# Patient Record
Sex: FEMALE
[De-identification: names, ages, dates, MRNs, and addresses within clinical notes are randomized per-mention and may not be internally consistent; named-entity substitution may affect disease eponyms.]

---

## 2021-09-17 PROBLEM — Z00.00 ENCOUNTER FOR PREVENTIVE HEALTH EXAMINATION: Status: ACTIVE | Noted: 2021-09-17

## 2021-09-22 ENCOUNTER — APPOINTMENT (OUTPATIENT)
Dept: BARIATRICS | Facility: CLINIC | Age: 66
End: 2021-09-22
Payer: MEDICARE

## 2021-09-22 VITALS — HEIGHT: 68 IN | WEIGHT: 195 LBS | BODY MASS INDEX: 29.55 KG/M2

## 2021-09-22 DIAGNOSIS — Z98.84 BARIATRIC SURGERY STATUS: ICD-10-CM

## 2021-09-22 DIAGNOSIS — E78.00 PURE HYPERCHOLESTEROLEMIA, UNSPECIFIED: ICD-10-CM

## 2021-09-22 DIAGNOSIS — E07.9 DISORDER OF THYROID, UNSPECIFIED: ICD-10-CM

## 2021-09-22 DIAGNOSIS — M06.9 RHEUMATOID ARTHRITIS, UNSPECIFIED: ICD-10-CM

## 2021-09-22 DIAGNOSIS — I51.9 HEART DISEASE, UNSPECIFIED: ICD-10-CM

## 2021-09-22 DIAGNOSIS — I10 ESSENTIAL (PRIMARY) HYPERTENSION: ICD-10-CM

## 2021-09-22 DIAGNOSIS — I25.10 ATHEROSCLEROTIC HEART DISEASE OF NATIVE CORONARY ARTERY W/OUT ANGINA PECTORIS: ICD-10-CM

## 2021-09-22 PROCEDURE — 99203 OFFICE O/P NEW LOW 30 MIN: CPT | Mod: 95

## 2021-09-22 NOTE — ASSESSMENT
[FreeTextEntry1] : I spent approximately 30 minutes discussing with patient. Although I have not seen op reports and don't have copies of her recent studies, from listening to history and knowing amount of previous surgery she's had, I told her I felt it was nearly impossible to titrate the degree of malabsorption to prevent return of symptoms that she had previously, provide good weight loss and not have considerable complications. 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 I see little chance of this improving with distalization. I explained that she is very susceptible as she is looking for aggressive solutions and I see little potential benefit of trying to revise and rather than believe that the multiple issues she's having are because of weight regain that can be addressed with further surgery, I believe they are the results of multiple operations and their complications. I strongly suggested that she explore non-operative lifestyle improvements and unfortunately, believe that the severity of her illness when she required a feeding tube probably led to some irreversible deficits.

## 2021-09-22 NOTE — END OF VISIT
[FreeTextEntry3] : All medical record entries made by the Scribe were at my, Dr. Horan's, discretion and personally dictated by me on 09/22/2021. I have reviewed the chart and agree that the record accurately reflects my personal performance of the history, physical exam, assessment and plan. I have also personally directed, reviewed and agreed to the chart.

## 2021-09-22 NOTE — HISTORY OF PRESENT ILLNESS
[de-identified] : Marline Ferguson is a 64 y/o F seen in consultation. 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 Also has had small bowel obstructions and failed incisional hernia repairs. She gets in touch because she's gained 60 lbs and wants to have her common channel shortened. Has been in discussions with Dr. Stuart from [facility] who suggested she be evaluated closer to home. States she is on 47 medications. She is in pain and has had return of her metabolic issues. \par

## 2021-09-22 NOTE — ADDENDUM
[FreeTextEntry1] : This note was written by Lorna Foster on 09/22/2021 acting as scribe for Dr. Horan.

## 2021-10-20 ENCOUNTER — APPOINTMENT (OUTPATIENT)
Dept: BARIATRICS | Facility: CLINIC | Age: 66
End: 2021-10-20